# Patient Record
Sex: MALE | Race: WHITE | NOT HISPANIC OR LATINO | URBAN - METROPOLITAN AREA
[De-identification: names, ages, dates, MRNs, and addresses within clinical notes are randomized per-mention and may not be internally consistent; named-entity substitution may affect disease eponyms.]

---

## 2019-09-26 ENCOUNTER — OUTPATIENT (OUTPATIENT)
Dept: OUTPATIENT SERVICES | Facility: HOSPITAL | Age: 64
LOS: 1 days | Discharge: HOME | End: 2019-09-26

## 2019-09-26 DIAGNOSIS — L97.521 NON-PRESSURE CHRONIC ULCER OF OTHER PART OF LEFT FOOT LIMITED TO BREAKDOWN OF SKIN: ICD-10-CM

## 2022-03-31 VITALS
WEIGHT: 203 LBS | SYSTOLIC BLOOD PRESSURE: 130 MMHG | BODY MASS INDEX: 30.07 KG/M2 | HEIGHT: 69 IN | HEART RATE: 70 BPM | TEMPERATURE: 97 F | DIASTOLIC BLOOD PRESSURE: 80 MMHG

## 2022-08-18 PROBLEM — Z00.00 ENCOUNTER FOR PREVENTIVE HEALTH EXAMINATION: Status: ACTIVE | Noted: 2022-08-18

## 2022-08-23 ENCOUNTER — NON-APPOINTMENT (OUTPATIENT)
Age: 67
End: 2022-08-23

## 2022-08-23 DIAGNOSIS — R04.0 EPISTAXIS: ICD-10-CM

## 2022-09-23 DIAGNOSIS — Z82.49 FAMILY HISTORY OF ISCHEMIC HEART DISEASE AND OTHER DISEASES OF THE CIRCULATORY SYSTEM: ICD-10-CM

## 2022-09-30 ENCOUNTER — APPOINTMENT (OUTPATIENT)
Dept: CARDIOLOGY | Facility: CLINIC | Age: 67
End: 2022-09-30
Payer: MEDICARE

## 2022-09-30 VITALS — DIASTOLIC BLOOD PRESSURE: 70 MMHG | SYSTOLIC BLOOD PRESSURE: 120 MMHG | HEART RATE: 70 BPM

## 2022-09-30 VITALS — HEIGHT: 69 IN | WEIGHT: 202.13 LBS | BODY MASS INDEX: 29.94 KG/M2

## 2022-09-30 PROCEDURE — 99214 OFFICE O/P EST MOD 30 MIN: CPT

## 2022-09-30 PROCEDURE — 99204 OFFICE O/P NEW MOD 45 MIN: CPT

## 2022-10-11 ENCOUNTER — OUTPATIENT (OUTPATIENT)
Dept: OUTPATIENT SERVICES | Facility: HOSPITAL | Age: 67
LOS: 1 days | Discharge: HOME | End: 2022-10-11

## 2022-10-11 DIAGNOSIS — E78.1 PURE HYPERGLYCERIDEMIA: ICD-10-CM

## 2022-10-11 PROCEDURE — 75571 CT HRT W/O DYE W/CA TEST: CPT | Mod: 26

## 2022-10-12 NOTE — HISTORY OF PRESENT ILLNESS
[FreeTextEntry1] : PT WITH HTN, EPISTAXIS IN THE PAST, FAMILY H/O CAD FATHER IN 40'S, HYPERTG, PREDM  AT [facility]. \par \par last , A1c 5.9  \par 10 YEAR ASCVD: 18.6% \par \par pt curtailed pasta and breads and gonna get a copy of labs. Pt says feels well. pt takes  co q 10 and red yeast rice since last visit. \par GFR 68 \par  to 108  to 169 \par A1c 5.9 to 6.2 \par 10 year RISK NOW 15%. \par \par 10/11/22: CAC: 69

## 2022-10-12 NOTE — DISCUSSION/SUMMARY
[FreeTextEntry1] : Pt improved cholesterol \par dm worsening \par f/u in 6 months \par get CAC \par father MI in 40's

## 2023-02-01 RX ORDER — IRBESARTAN 300 MG/1
300 TABLET ORAL DAILY
Qty: 90 | Refills: 3 | Status: COMPLETED | COMMUNITY

## 2023-02-01 RX ORDER — ROSUVASTATIN CALCIUM 20 MG/1
20 TABLET, FILM COATED ORAL DAILY
Qty: 90 | Refills: 3 | Status: ACTIVE | COMMUNITY
Start: 2022-10-12 | End: 1900-01-01

## 2023-02-01 RX ORDER — DILTIAZEM HYDROCHLORIDE 240 MG/1
240 CAPSULE, EXTENDED RELEASE ORAL DAILY
Qty: 90 | Refills: 3 | Status: COMPLETED | COMMUNITY

## 2023-05-30 NOTE — HISTORY OF PRESENT ILLNESS
[FreeTextEntry1] : PT WITH HTN, EPISTAXIS IN THE PAST, FAMILY H/O CAD FATHER IN 40'S, HYPERTG, PREDM  AT [facility]. \par \par last , A1c 5.9  \par 10 YEAR ASCVD: 18.6% \par \par pt curtailed pasta and breads and gonna get a copy of labs. Pt says feels well. pt takes  co q 10 and red yeast rice since last visit. \par GFR 68 \par  to 108  to 169 \par A1c 5.9 to 6.2 \par 10 year RISK NOW 15%. \par \par 10/11/22: CAC: 69 \par \par 6/1/23:\par

## 2023-06-01 ENCOUNTER — APPOINTMENT (OUTPATIENT)
Dept: CARDIOLOGY | Facility: CLINIC | Age: 68
End: 2023-06-01
Payer: MEDICARE

## 2023-06-01 VITALS — HEIGHT: 69 IN | WEIGHT: 204.13 LBS | BODY MASS INDEX: 30.23 KG/M2

## 2023-06-01 VITALS — DIASTOLIC BLOOD PRESSURE: 90 MMHG | HEART RATE: 74 BPM | SYSTOLIC BLOOD PRESSURE: 150 MMHG

## 2023-06-01 DIAGNOSIS — R73.03 PREDIABETES.: ICD-10-CM

## 2023-06-01 DIAGNOSIS — R93.1 ABNORMAL FINDINGS ON DIAGNOSTIC IMAGING OF HEART AND CORONARY CIRCULATION: ICD-10-CM

## 2023-06-01 DIAGNOSIS — I10 ESSENTIAL (PRIMARY) HYPERTENSION: ICD-10-CM

## 2023-06-01 DIAGNOSIS — I65.23 OCCLUSION AND STENOSIS OF BILATERAL CAROTID ARTERIES: ICD-10-CM

## 2023-06-01 DIAGNOSIS — E78.1 PURE HYPERGLYCERIDEMIA: ICD-10-CM

## 2023-06-01 PROCEDURE — 99214 OFFICE O/P EST MOD 30 MIN: CPT

## 2023-06-01 RX ORDER — EMPAGLIFLOZIN 10 MG/1
10 TABLET, FILM COATED ORAL DAILY
Qty: 90 | Refills: 3 | Status: ACTIVE | COMMUNITY
Start: 2023-06-01 | End: 1900-01-01

## 2023-06-01 NOTE — DISCUSSION/SUMMARY
[FreeTextEntry1] : get CAC \par father MI in 40's \par pt improved diet and did not improve a1c and frustrated and patient going to ride bike, pt lives in [facility]. \par get 2 d echo \par get bloodwork \par start jardiance as bp still high \par check bp at home \par pt to get lfts\par

## 2023-06-01 NOTE — PHYSICAL EXAM
[Normal Venous Pressure] : normal venous pressure [Normal S1, S2] : normal S1, S2 [Clear Lung Fields] : clear lung fields [Soft] : abdomen soft [No Edema] : no edema [de-identified] : rrr

## 2023-06-01 NOTE — HISTORY OF PRESENT ILLNESS
[FreeTextEntry1] : PT WITH HTN, EPISTAXIS IN THE PAST, FAMILY H/O CAD FATHER IN 40'S, HLD, HYPERTG, PREDM  AT [facility]. Elevated CAC \par 3/22  , A1c 5.9  \par 10 YEAR ASCVD: 18.6% \par \par pt curtailed pasta and breads and gonna get a copy of labs. Pt says feels well. pt takes  co q 10 and red yeast rice since last visit. \par GFR 68 \par  to 108  to 169 \par A1c 5.9 to 6.2 \par 10 year RISK NOW 15%. \par \par 10/11/22: CAC: 69 \par \par 23:\par ALT/AST: 92/54   and T to 195 HDl 49 a1c: 6.2 not changed from previous. \par pt had sinus issues, pt bloodwork from september.

## 2023-09-07 ENCOUNTER — APPOINTMENT (OUTPATIENT)
Dept: CARDIOLOGY | Facility: CLINIC | Age: 68
End: 2023-09-07

## 2023-12-05 ENCOUNTER — RX RENEWAL (OUTPATIENT)
Age: 68
End: 2023-12-05

## 2023-12-05 RX ORDER — DILTIAZEM HYDROCHLORIDE 240 MG/1
240 CAPSULE, EXTENDED RELEASE ORAL DAILY
Qty: 90 | Refills: 3 | Status: ACTIVE | COMMUNITY
Start: 2023-02-22 | End: 1900-01-01

## 2023-12-05 RX ORDER — IRBESARTAN 300 MG/1
300 TABLET ORAL DAILY
Qty: 90 | Refills: 3 | Status: ACTIVE | COMMUNITY
Start: 2023-02-22 | End: 1900-01-01